# Patient Record
Sex: FEMALE | Race: WHITE | NOT HISPANIC OR LATINO | Employment: STUDENT | URBAN - METROPOLITAN AREA
[De-identification: names, ages, dates, MRNs, and addresses within clinical notes are randomized per-mention and may not be internally consistent; named-entity substitution may affect disease eponyms.]

---

## 2024-05-29 PROBLEM — Z01.419 ENCOUNTER FOR GYNECOLOGICAL EXAMINATION WITHOUT ABNORMAL FINDING: Status: ACTIVE | Noted: 2024-05-29

## 2024-05-29 PROBLEM — Z12.4 SCREENING FOR CERVICAL CANCER: Status: ACTIVE | Noted: 2024-05-29

## 2024-05-29 PROBLEM — Z30.09 CONTRACEPTIVE EDUCATION: Status: ACTIVE | Noted: 2024-05-29

## 2024-05-29 PROBLEM — Z11.3 SCREEN FOR STD (SEXUALLY TRANSMITTED DISEASE): Status: ACTIVE | Noted: 2024-05-29

## 2024-05-30 ENCOUNTER — ANNUAL EXAM (OUTPATIENT)
Dept: OBGYN CLINIC | Facility: CLINIC | Age: 24
End: 2024-05-30
Payer: COMMERCIAL

## 2024-05-30 VITALS
WEIGHT: 120.4 LBS | SYSTOLIC BLOOD PRESSURE: 116 MMHG | BODY MASS INDEX: 20.55 KG/M2 | DIASTOLIC BLOOD PRESSURE: 78 MMHG | HEIGHT: 64 IN

## 2024-05-30 DIAGNOSIS — Z11.3 SCREEN FOR STD (SEXUALLY TRANSMITTED DISEASE): ICD-10-CM

## 2024-05-30 DIAGNOSIS — Z12.4 SCREENING FOR CERVICAL CANCER: ICD-10-CM

## 2024-05-30 DIAGNOSIS — Z01.419 ENCOUNTER FOR GYNECOLOGICAL EXAMINATION WITHOUT ABNORMAL FINDING: Primary | ICD-10-CM

## 2024-05-30 DIAGNOSIS — Z30.09 CONTRACEPTIVE EDUCATION: ICD-10-CM

## 2024-05-30 PROCEDURE — S0610 ANNUAL GYNECOLOGICAL EXAMINA: HCPCS | Performed by: OBSTETRICS & GYNECOLOGY

## 2024-05-30 RX ORDER — NORGESTIMATE AND ETHINYL ESTRADIOL 0.25-0.035
1 KIT ORAL DAILY
COMMUNITY
End: 2024-05-30 | Stop reason: SDUPTHER

## 2024-05-30 RX ORDER — NORGESTIMATE AND ETHINYL ESTRADIOL 0.25-0.035
1 KIT ORAL DAILY
Qty: 84 TABLET | Refills: 4 | Status: SHIPPED | OUTPATIENT
Start: 2024-05-30

## 2024-05-30 NOTE — PROGRESS NOTES
[facility] - [address], [address], [place] [address]    ASSESSMENT/PLAN: Vy More is a 23 y.o.  who presents for annual gynecologic exam.    Encounter for routine gynecologic examination  - Routine well woman exam completed today.  - HPV Vaccination status: Immunization series complete  - STI screening offered including HIV testing: Declined  - Contraceptive counseling discussed.  Current contraception: condoms or combination OCPs:   -pap smear  never today     Additional problems addressed during this visit:  1. Encounter for gynecological examination without abnormal finding  2. Screening for cervical cancer  -     IGP, CtNg, rfx Aptima HPV ASCU  3. Screen for STD (sexually transmitted disease)  -     IGP, CtNg, rfx Aptima HPV ASCU  4. Contraceptive education  -     Estarylla 0.25-35 MG-MCG per tablet; Take 1 tablet by mouth daily    CC:  Annual Gynecologic Examination    HPI: Vy More is a 23 y.o.  who presents for annual gynecologic examination.  22 yo here for wellness exam.  On ocp  no missed pills denies  aches and  btb.  Same partner  .    Cycles monthly        The following portions of the patient's history were reviewed and updated as appropriate: She  has no past medical history on file.  She  has a past surgical history that includes Tonsillectomy (Bilateral) and Shoulder arthroscopy w/ labral repair (Right).  Her family history includes Deep vein thrombosis in her father.  She  reports that she has never smoked. She has never used smokeless tobacco. She reports current alcohol use. She reports that she does not use drugs.  Current Outpatient Medications   Medication Sig Dispense Refill   • Estarylla 0.25-35 MG-MCG per tablet Take 1 tablet by mouth daily 84 tablet 4     No current facility-administered medications for this visit.     She is allergic to sulfa antibiotics..    Review of Systems   Constitutional:  Negative for chills and  "fever.   HENT:  Negative for ear pain and sore throat.    Eyes:  Negative for pain and visual disturbance.   Respiratory:  Negative for cough and shortness of breath.    Cardiovascular:  Negative for chest pain and palpitations.   Gastrointestinal:  Negative for abdominal pain and vomiting.   Endocrine: Negative.    Genitourinary: Negative.  Negative for dysuria and hematuria.   Musculoskeletal:  Negative for arthralgias and back pain.   Skin:  Negative for color change and rash.   Allergic/Immunologic: Negative.    Neurological: Negative.  Negative for seizures and syncope.   Hematological: Negative.    Psychiatric/Behavioral: Negative.     All other systems reviewed and are negative.        Objective:  /78 (BP Location: Left arm, Patient Position: Sitting, Cuff Size: Standard)   Ht 5' 4\" (1.626 m)   Wt 54.6 kg (120 lb 6.4 oz)   LMP 05/23/2024   BMI 20.67 kg/m²    Physical Exam  Vitals and nursing note reviewed.   Constitutional:       Appearance: Normal appearance.   HENT:      Head: Normocephalic.   Cardiovascular:      Rate and Rhythm: Normal rate and regular rhythm.      Pulses: Normal pulses.      Heart sounds: Normal heart sounds.   Pulmonary:      Effort: Pulmonary effort is normal.      Breath sounds: Normal breath sounds.   Chest:      Chest wall: No mass, lacerations, swelling, tenderness or edema.   Breasts:     Hilton Score is 4.      Breasts are symmetrical.      Right: Normal. No swelling, bleeding, inverted nipple, mass, nipple discharge, skin change or tenderness.      Left: No swelling, bleeding, inverted nipple, mass, nipple discharge, skin change or tenderness.   Abdominal:      General: Abdomen is flat. Bowel sounds are normal.      Palpations: Abdomen is soft.   Genitourinary:     General: Normal vulva.      Exam position: Lithotomy position.      Pubic Area: No rash.       Hilton stage (genital): 4.      Labia:         Right: No rash, tenderness or lesion.         Left: No rash, " tenderness or lesion.       Urethra: No urethral pain, urethral swelling or urethral lesion.      Vagina: Normal.      Cervix: No cervical motion tenderness or discharge.      Uterus: Normal.       Adnexa: Right adnexa normal and left adnexa normal.      Rectum: Normal.   Musculoskeletal:         General: Normal range of motion.      Cervical back: Normal range of motion and neck supple.   Lymphadenopathy:      Upper Body:      Right upper body: No supraclavicular, axillary or pectoral adenopathy.      Left upper body: No supraclavicular, axillary or pectoral adenopathy.      Lower Body: No right inguinal adenopathy. No left inguinal adenopathy.   Skin:     General: Skin is warm and dry.   Neurological:      General: No focal deficit present.      Mental Status: She is alert and oriented to person, place, and time.   Psychiatric:         Mood and Affect: Mood normal.         Behavior: Behavior normal.         Thought Content: Thought content normal.         Judgment: Judgment normal.           0 = independent

## 2024-06-04 LAB
C TRACH RRNA CVX QL NAA+PROBE: NEGATIVE
CYTOLOGIST CVX/VAG CYTO: NORMAL
DX ICD CODE: NORMAL
N GONORRHOEA RRNA CVX QL NAA+PROBE: NEGATIVE
OTHER STN SPEC: NORMAL
OTHER STN SPEC: NORMAL
PATH REPORT.FINAL DX SPEC: NORMAL
SL AMB NOTE:: NORMAL
SL AMB SPECIMEN ADEQUACY: NORMAL
SL AMB TEST METHODOLOGY: NORMAL

## 2024-06-28 PROBLEM — Z12.4 SCREENING FOR CERVICAL CANCER: Status: RESOLVED | Noted: 2024-05-29 | Resolved: 2024-06-28

## 2024-06-28 PROBLEM — Z11.3 SCREEN FOR STD (SEXUALLY TRANSMITTED DISEASE): Status: RESOLVED | Noted: 2024-05-29 | Resolved: 2024-06-28

## 2024-06-28 PROBLEM — Z01.419 ENCOUNTER FOR GYNECOLOGICAL EXAMINATION WITHOUT ABNORMAL FINDING: Status: RESOLVED | Noted: 2024-05-29 | Resolved: 2024-06-28

## 2025-02-03 DIAGNOSIS — Z30.09 CONTRACEPTIVE EDUCATION: ICD-10-CM

## 2025-02-03 RX ORDER — NORGESTIMATE AND ETHINYL ESTRADIOL 0.25-0.035
1 KIT ORAL DAILY
Qty: 90 TABLET | Refills: 0 | Status: SHIPPED | OUTPATIENT
Start: 2025-02-03

## 2025-02-03 NOTE — TELEPHONE ENCOUNTER
Patient needs 3 months supply for insurance to cover also change [facility]     Reason for call:   [x] Refill   [] Prior Auth  [] Other:     Office:   [x] PCP/Provider - Cathy Guallpa  [] Specialty/Provider -     Medication: Estrylla     Dose/Frequency: 0.25 mg- 35 mcg Daily     Quantity: 84    Pharmacy: [facility] [address]     Does the patient have enough for 3 days?   [] Yes   [x] No - Send as HP to POD

## 2025-04-27 DIAGNOSIS — Z30.09 CONTRACEPTIVE EDUCATION: ICD-10-CM

## 2025-04-28 DIAGNOSIS — Z30.09 CONTRACEPTIVE EDUCATION: ICD-10-CM

## 2025-04-28 RX ORDER — NORGESTIMATE AND ETHINYL ESTRADIOL 0.25-0.035
1 KIT ORAL DAILY
Qty: 90 TABLET | Refills: 0 | Status: SHIPPED | OUTPATIENT
Start: 2025-04-28 | End: 2025-04-29

## 2025-04-28 NOTE — TELEPHONE ENCOUNTER
Patient is requesting a 90 day supply    Reason for call:   [x] Refill   [] Prior Auth  [] Other:     Office:   [] PCP/Provider -   [x] Specialty/Provider - Cathy Shoemaker    Medication: Estarylla 0.25-35 MG-MCG per tablet     Dose/Frequency: Take 1 tablet by mouth daily     Quantity: 90 tablet     Pharmacy: [facility]/pharmacy #[id number]     Does the patient have enough for 3 days?   [] Yes   [x] No - Send as HP to POD

## 2025-04-29 RX ORDER — NORGESTIMATE AND ETHINYL ESTRADIOL 0.25-0.035
1 KIT ORAL DAILY
Qty: 84 TABLET | Refills: 0 | Status: SHIPPED | OUTPATIENT
Start: 2025-04-29

## 2025-04-29 NOTE — TELEPHONE ENCOUNTER
Patient called to request a refill for their norgestimate-eth  advised a refill was requested on 4/27/25 and is pending approval. Patient verbalized understanding and is in agreement.     Does the patient have enough for 3 days?   [] Yes   [x] No - Send as HP to POD    Medication went to the incorrect pharmacy

## 2025-05-06 ENCOUNTER — TELEPHONE (OUTPATIENT)
Age: 25
End: 2025-05-06

## 2025-05-06 NOTE — TELEPHONE ENCOUNTER
Pt calling in saying that she's going on vacation at the end of May and would like to know if she can skip the placebo pills and start a new pack, or if there is anything she can do further to not get a period. Pt was notified that if she skipped the placebo pills, she can still have bleeding or spotting, and it can throw off the next couple of cycles, pt verbalized understanding, pt would still like recommendations from the provider.

## 2025-05-06 NOTE — TELEPHONE ENCOUNTER
Other option would be to stop her pills the  week prior to her menses and start a new pack up   2-3 d prior to leaving for vacation.

## 2025-05-07 NOTE — TELEPHONE ENCOUNTER
Pt was contacted and notified of the providers recommendations, pt verbalized understanding and is requesting to speak to the provider directly, pt was notified that a message will be sent to the provider, pt verbalized understanding

## 2025-05-08 NOTE — TELEPHONE ENCOUNTER
Advised patient. Patient will skip placebo pills. Goal is to not have emotional ups and downs with placebo week.

## 2025-05-08 NOTE — TELEPHONE ENCOUNTER
TC to pt  VM left she has 2 options.  Stop her pill  one week prior to going away.  Bleed out  what lining she has. Only do  4  pill free days.  Restart.  Second option is to do the pill continuously.  No placebo.  Either may have some bleeding but usually lighter .

## 2025-06-26 ENCOUNTER — ANNUAL EXAM (OUTPATIENT)
Dept: OBGYN CLINIC | Facility: CLINIC | Age: 25
End: 2025-06-26
Payer: COMMERCIAL

## 2025-06-26 VITALS
SYSTOLIC BLOOD PRESSURE: 104 MMHG | BODY MASS INDEX: 20.83 KG/M2 | DIASTOLIC BLOOD PRESSURE: 68 MMHG | WEIGHT: 122 LBS | HEIGHT: 64 IN

## 2025-06-26 DIAGNOSIS — Z11.3 SCREEN FOR STD (SEXUALLY TRANSMITTED DISEASE): Primary | ICD-10-CM

## 2025-06-26 DIAGNOSIS — Z30.09 CONTRACEPTIVE EDUCATION: ICD-10-CM

## 2025-06-26 DIAGNOSIS — Z01.419 ENCOUNTER FOR GYNECOLOGICAL EXAMINATION WITHOUT ABNORMAL FINDING: ICD-10-CM

## 2025-06-26 PROCEDURE — S0612 ANNUAL GYNECOLOGICAL EXAMINA: HCPCS | Performed by: OBSTETRICS & GYNECOLOGY

## 2025-06-26 RX ORDER — NORGESTIMATE AND ETHINYL ESTRADIOL 0.25-0.035
1 KIT ORAL DAILY
Qty: 84 TABLET | Refills: 3 | Status: SHIPPED | OUTPATIENT
Start: 2025-06-26

## 2025-06-26 NOTE — PROGRESS NOTES
[facility] - [place]  [address], [address], [place] [address]    ASSESSMENT/PLAN: Vy More is a 24 y.o.  who presents for annual gynecologic exam.    Encounter for routine gynecologic examination  - Routine well woman exam completed today.  - HPV Vaccination status: Immunization series complete  - STI screening offered including HIV testing: Declined  - Contraceptive counseling discussed.  Current contraception: condoms or combination OCPs:     Additional problems addressed during this visit:  1. Screen for STD (sexually transmitted disease)  -     Chlamydia/GC AMANDA, Confirmation  -     Hepatitis B surface antigen; Future; Expected date: Collect anytime  -     Human Immunodeficiency Virus 1/2 Antigen / Antibody ( Fourth Generation) with Reflex Testing; Future  -     RPR, Rfx Qn RPR/Confirm TP; Future  -     Hepatitis B surface antigen  -     Human Immunodeficiency Virus 1/2 Antigen / Antibody ( Fourth Generation) with Reflex Testing  -     RPR, Rfx Qn RPR/Confirm TP  2. Contraceptive education  -     Estarylla 0.25-35 MG-MCG per tablet; Take 1 tablet by mouth in the morning  3. Encounter for gynecological examination without abnormal finding    CC:  Annual Gynecologic Examination    HPI: Vy More is a 24 y.o.  who presents for annual gynecologic examination.  25 yo  here for wellness exam. On ocp w occ  missed pills.  Denies ACHES .   Desires sti screening  + gardasil       The following portions of the patient's history were reviewed and updated as appropriate: She  has no past medical history on file.  She  has a past surgical history that includes Tonsillectomy (Bilateral) and Shoulder arthroscopy w/ labral repair (Right).  Her family history includes Deep vein thrombosis in her father; Ovarian cancer (age of onset: 80) in her maternal grandmother.  She  reports that she has never smoked. She has never used smokeless tobacco. She reports current alcohol  "use. She reports that she does not use drugs.  Current Outpatient Medications   Medication Sig Dispense Refill   • Estarylla 0.25-35 MG-MCG per tablet Take 1 tablet by mouth in the morning 84 tablet 3     No current facility-administered medications for this visit.     She is allergic to sulfa antibiotics..    Review of Systems   Constitutional:  Negative for chills and fever.   HENT:  Negative for ear pain and sore throat.    Eyes:  Negative for pain and visual disturbance.   Respiratory:  Negative for cough and shortness of breath.    Cardiovascular:  Negative for chest pain and palpitations.   Gastrointestinal:  Negative for abdominal pain and vomiting.   Endocrine: Negative.    Genitourinary:  Negative for dysuria and hematuria.   Musculoskeletal:  Negative for arthralgias and back pain.   Skin:  Negative for color change and rash.   Allergic/Immunologic: Negative.    Neurological: Negative.  Negative for seizures and syncope.   Hematological: Negative.    Psychiatric/Behavioral: Negative.     All other systems reviewed and are negative.        Objective:  /68 (BP Location: Right arm, Patient Position: Sitting, Cuff Size: Standard)   Ht 5' 4\" (1.626 m)   Wt 55.3 kg (122 lb)   LMP 06/01/2025 (Within Days)   BMI 20.94 kg/m²    Physical Exam  Vitals and nursing note reviewed.   Constitutional:       Appearance: Normal appearance.   HENT:      Head: Normocephalic.     Cardiovascular:      Rate and Rhythm: Normal rate and regular rhythm.      Pulses: Normal pulses.      Heart sounds: Normal heart sounds.   Pulmonary:      Effort: Pulmonary effort is normal.      Breath sounds: Normal breath sounds.   Chest:      Chest wall: No mass, lacerations, swelling, tenderness or edema.   Breasts:     Hilton Score is 4.      Breasts are symmetrical.      Right: Normal. No swelling, bleeding, inverted nipple, mass, nipple discharge, skin change or tenderness.      Left: No swelling, bleeding, inverted nipple, mass, " nipple discharge, skin change or tenderness.   Abdominal:      General: Abdomen is flat. Bowel sounds are normal.      Palpations: Abdomen is soft.   Genitourinary:     General: Normal vulva.      Exam position: Lithotomy position.      Pubic Area: No rash.       Hilton stage (genital): 4.      Labia:         Right: No rash, tenderness or lesion.         Left: No rash, tenderness or lesion.       Urethra: No urethral pain, urethral swelling or urethral lesion.      Vagina: Normal.      Cervix: No cervical motion tenderness or discharge.      Uterus: Normal.       Adnexa: Right adnexa normal and left adnexa normal.      Rectum: Normal.     Musculoskeletal:         General: Normal range of motion.      Cervical back: Normal range of motion and neck supple.   Lymphadenopathy:      Upper Body:      Right upper body: No supraclavicular, axillary or pectoral adenopathy.      Left upper body: No supraclavicular, axillary or pectoral adenopathy.      Lower Body: No right inguinal adenopathy. No left inguinal adenopathy.     Skin:     General: Skin is warm and dry.     Neurological:      General: No focal deficit present.      Mental Status: She is alert and oriented to person, place, and time.     Psychiatric:         Mood and Affect: Mood normal.         Behavior: Behavior normal.         Thought Content: Thought content normal.         Judgment: Judgment normal.

## 2025-06-29 LAB
C TRACH RRNA SPEC QL NAA+PROBE: NEGATIVE
N GONORRHOEA RRNA SPEC QL NAA+PROBE: NEGATIVE

## 2025-07-31 ENCOUNTER — TELEPHONE (OUTPATIENT)
Age: 25
End: 2025-07-31